# Patient Record
Sex: MALE | Race: WHITE | NOT HISPANIC OR LATINO | Employment: FULL TIME | URBAN - METROPOLITAN AREA
[De-identification: names, ages, dates, MRNs, and addresses within clinical notes are randomized per-mention and may not be internally consistent; named-entity substitution may affect disease eponyms.]

---

## 2018-07-07 ENCOUNTER — APPOINTMENT (EMERGENCY)
Dept: RADIOLOGY | Facility: HOSPITAL | Age: 53
End: 2018-07-07
Payer: COMMERCIAL

## 2018-07-07 ENCOUNTER — HOSPITAL ENCOUNTER (EMERGENCY)
Facility: HOSPITAL | Age: 53
End: 2018-07-07
Payer: COMMERCIAL

## 2018-07-07 ENCOUNTER — APPOINTMENT (INPATIENT)
Dept: RADIOLOGY | Facility: HOSPITAL | Age: 53
DRG: 917 | End: 2018-07-07
Payer: COMMERCIAL

## 2018-07-07 ENCOUNTER — APPOINTMENT (EMERGENCY)
Dept: CT IMAGING | Facility: HOSPITAL | Age: 53
End: 2018-07-07
Payer: COMMERCIAL

## 2018-07-07 ENCOUNTER — HOSPITAL ENCOUNTER (INPATIENT)
Facility: HOSPITAL | Age: 53
LOS: 2 days | Discharge: LEFT BEFORE DISCHARGE | DRG: 917 | End: 2018-07-09
Attending: EMERGENCY MEDICINE | Admitting: EMERGENCY MEDICINE
Payer: COMMERCIAL

## 2018-07-07 VITALS
HEART RATE: 97 BPM | BODY MASS INDEX: 19.51 KG/M2 | OXYGEN SATURATION: 97 % | TEMPERATURE: 97.6 F | DIASTOLIC BLOOD PRESSURE: 57 MMHG | SYSTOLIC BLOOD PRESSURE: 111 MMHG | HEIGHT: 74 IN | RESPIRATION RATE: 27 BRPM | WEIGHT: 152 LBS

## 2018-07-07 DIAGNOSIS — T50.904A DRUG OVERDOSE, UNDETERMINED INTENT, INITIAL ENCOUNTER: ICD-10-CM

## 2018-07-07 DIAGNOSIS — I21.3 ST ELEVATION MYOCARDIAL INFARCTION (STEMI), UNSPECIFIED ARTERY (HCC): Primary | ICD-10-CM

## 2018-07-07 DIAGNOSIS — T40.5X1A: Primary | ICD-10-CM

## 2018-07-07 LAB
ALBUMIN SERPL BCP-MCNC: 4.2 G/DL (ref 3.5–5.7)
ALP SERPL-CCNC: 50 U/L (ref 40–150)
ALT SERPL W P-5'-P-CCNC: 10 U/L (ref 7–52)
ANION GAP SERPL CALCULATED.3IONS-SCNC: 13 MMOL/L (ref 4–13)
APAP SERPL-MCNC: <10 UG/ML (ref 10–30)
ARTERIAL PATENCY WRIST A: YES
AST SERPL W P-5'-P-CCNC: 24 U/L (ref 13–39)
ATRIAL RATE: 83 BPM
BASE EXCESS BLDA CALC-SCNC: -3.1 MMOL/L (ref -2–3)
BILIRUB SERPL-MCNC: 0.6 MG/DL (ref 0.2–1)
BUN SERPL-MCNC: 23 MG/DL (ref 7–25)
CALCIUM SERPL-MCNC: 9.5 MG/DL (ref 8.6–10.5)
CHLORIDE SERPL-SCNC: 107 MMOL/L (ref 98–107)
CK MB SERPL-MCNC: 1 % (ref 0–2.5)
CK MB SERPL-MCNC: 26.6 NG/ML (ref 0–5)
CK SERPL-CCNC: 2712 U/L (ref 39–308)
CO2 SERPL-SCNC: 23 MMOL/L (ref 21–31)
CREAT SERPL-MCNC: 2.5 MG/DL (ref 0.7–1.3)
EOSINOPHIL # BLD AUTO: 0.23 THOUSAND/UL (ref 0–0.61)
EOSINOPHIL NFR BLD MANUAL: 1 % (ref 0–6)
ERYTHROCYTE [DISTWIDTH] IN BLOOD BY AUTOMATED COUNT: 14 % (ref 11.5–14.5)
ETHANOL SERPL-MCNC: <10 MG/DL
GFR SERPL CREATININE-BSD FRML MDRD: 28 ML/MIN/1.73SQ M
GLUCOSE SERPL-MCNC: 100 MG/DL (ref 65–140)
GLUCOSE SERPL-MCNC: 94 MG/DL (ref 65–99)
GLUCOSE SERPL-MCNC: 95 MG/DL (ref 65–140)
HCO3 BLDA-SCNC: 22.9 MMOL/L (ref 22–28)
HCT VFR BLD AUTO: 40.4 % (ref 36.5–49.3)
HGB BLD-MCNC: 13.7 G/DL (ref 14–18)
HOLD SPECIMEN: NORMAL
HOROWITZ INDEX BLDA+IHG-RTO: 100 MM[HG]
LACTATE SERPL-SCNC: 0.8 MMOL/L (ref 0.5–2)
LACTATE SERPL-SCNC: 2.6 MMOL/L (ref 0.5–2)
LYMPHOCYTES # BLD AUTO: 0.46 THOUSAND/UL (ref 0.6–4.47)
LYMPHOCYTES # BLD AUTO: 2 % (ref 20–51)
MAGNESIUM SERPL-MCNC: 2.8 MG/DL (ref 1.6–2.6)
MCH RBC QN AUTO: 32.4 PG (ref 26–34)
MCHC RBC AUTO-ENTMCNC: 33.9 G/DL (ref 31–37)
MCV RBC AUTO: 96 FL (ref 81–99)
MONOCYTES # BLD AUTO: 0.68 THOUSAND/UL (ref 0–1.22)
MONOCYTES NFR BLD AUTO: 3 % (ref 4–12)
NEUTS SEG # BLD: 21.43 THOUSAND/UL (ref 1.81–6.82)
NEUTS SEG NFR BLD AUTO: 94 % (ref 42–75)
NRBC BLD AUTO-RTO: 0 /100 WBCS
O2 CT BLDA-SCNC: 17.7 ML/DL
OXYHGB MFR BLDA: 97.2 % (ref 94–100)
P AXIS: 76 DEGREES
PCO2 BLDA: 47.8 MM HG (ref 35–45)
PEEP RESPIRATORY: 5 CM[H2O]
PH BLDA: 7.3 [PH] (ref 7.35–7.45)
PHOSPHATE SERPL-MCNC: 4.5 MG/DL (ref 2.7–4.5)
PLATELET # BLD AUTO: 264 THOUSANDS/UL (ref 149–390)
PLATELET BLD QL SMEAR: ADEQUATE
PMV BLD AUTO: 8.8 FL (ref 8.6–11.7)
PO2 BLDA: 486 MM HG (ref 80–100)
POTASSIUM SERPL-SCNC: 5.1 MMOL/L (ref 3.5–5.5)
PR INTERVAL: 146 MS
PROT SERPL-MCNC: 6.9 G/DL (ref 6.4–8.9)
QRS AXIS: 82 DEGREES
QRSD INTERVAL: 83 MS
QT INTERVAL: 392 MS
QTC INTERVAL: 461 MS
RBC # BLD AUTO: 4.23 MILLION/UL (ref 4.3–5.9)
RBC MORPH BLD: NORMAL
SALICYLATES SERPL-MCNC: <5 MG/DL (ref 10–30)
SODIUM SERPL-SCNC: 143 MMOL/L (ref 134–143)
SPECIMEN SOURCE: ABNORMAL
T WAVE AXIS: 69 DEGREES
TOTAL CELLS COUNTED SPEC: 100
TROPONIN I SERPL-MCNC: 0.03 NG/ML
TROPONIN I SERPL-MCNC: 0.04 NG/ML
TROPONIN I SERPL-MCNC: 0.04 NG/ML
VENT AC: 12
VENTRICULAR RATE: 83 BPM
VT SETTING VENT: 400 ML
WBC # BLD AUTO: 22.8 THOUSAND/UL (ref 4.8–10.8)

## 2018-07-07 PROCEDURE — 36415 COLL VENOUS BLD VENIPUNCTURE: CPT

## 2018-07-07 PROCEDURE — 82805 BLOOD GASES W/O2 SATURATION: CPT

## 2018-07-07 PROCEDURE — 96375 TX/PRO/DX INJ NEW DRUG ADDON: CPT

## 2018-07-07 PROCEDURE — 94760 N-INVAS EAR/PLS OXIMETRY 1: CPT

## 2018-07-07 PROCEDURE — 82553 CREATINE MB FRACTION: CPT | Performed by: STUDENT IN AN ORGANIZED HEALTH CARE EDUCATION/TRAINING PROGRAM

## 2018-07-07 PROCEDURE — 96376 TX/PRO/DX INJ SAME DRUG ADON: CPT

## 2018-07-07 PROCEDURE — 93005 ELECTROCARDIOGRAM TRACING: CPT

## 2018-07-07 PROCEDURE — 84484 ASSAY OF TROPONIN QUANT: CPT | Performed by: STUDENT IN AN ORGANIZED HEALTH CARE EDUCATION/TRAINING PROGRAM

## 2018-07-07 PROCEDURE — 93010 ELECTROCARDIOGRAM REPORT: CPT | Performed by: INTERNAL MEDICINE

## 2018-07-07 PROCEDURE — 36600 WITHDRAWAL OF ARTERIAL BLOOD: CPT

## 2018-07-07 PROCEDURE — 87040 BLOOD CULTURE FOR BACTERIA: CPT

## 2018-07-07 PROCEDURE — 99291 CRITICAL CARE FIRST HOUR: CPT | Performed by: EMERGENCY MEDICINE

## 2018-07-07 PROCEDURE — 80320 DRUG SCREEN QUANTALCOHOLS: CPT

## 2018-07-07 PROCEDURE — 85007 BL SMEAR W/DIFF WBC COUNT: CPT

## 2018-07-07 PROCEDURE — 80329 ANALGESICS NON-OPIOID 1 OR 2: CPT

## 2018-07-07 PROCEDURE — 71045 X-RAY EXAM CHEST 1 VIEW: CPT

## 2018-07-07 PROCEDURE — 82550 ASSAY OF CK (CPK): CPT | Performed by: STUDENT IN AN ORGANIZED HEALTH CARE EDUCATION/TRAINING PROGRAM

## 2018-07-07 PROCEDURE — 80053 COMPREHEN METABOLIC PANEL: CPT

## 2018-07-07 PROCEDURE — 5A1935Z RESPIRATORY VENTILATION, LESS THAN 24 CONSECUTIVE HOURS: ICD-10-PCS | Performed by: EMERGENCY MEDICINE

## 2018-07-07 PROCEDURE — 82948 REAGENT STRIP/BLOOD GLUCOSE: CPT

## 2018-07-07 PROCEDURE — 83605 ASSAY OF LACTIC ACID: CPT

## 2018-07-07 PROCEDURE — 84100 ASSAY OF PHOSPHORUS: CPT | Performed by: STUDENT IN AN ORGANIZED HEALTH CARE EDUCATION/TRAINING PROGRAM

## 2018-07-07 PROCEDURE — 94002 VENT MGMT INPAT INIT DAY: CPT

## 2018-07-07 PROCEDURE — 96374 THER/PROPH/DIAG INJ IV PUSH: CPT

## 2018-07-07 PROCEDURE — 83605 ASSAY OF LACTIC ACID: CPT | Performed by: STUDENT IN AN ORGANIZED HEALTH CARE EDUCATION/TRAINING PROGRAM

## 2018-07-07 PROCEDURE — 84484 ASSAY OF TROPONIN QUANT: CPT

## 2018-07-07 PROCEDURE — 96361 HYDRATE IV INFUSION ADD-ON: CPT

## 2018-07-07 PROCEDURE — 85027 COMPLETE CBC AUTOMATED: CPT

## 2018-07-07 PROCEDURE — 83735 ASSAY OF MAGNESIUM: CPT | Performed by: STUDENT IN AN ORGANIZED HEALTH CARE EDUCATION/TRAINING PROGRAM

## 2018-07-07 PROCEDURE — 99291 CRITICAL CARE FIRST HOUR: CPT

## 2018-07-07 PROCEDURE — 70450 CT HEAD/BRAIN W/O DYE: CPT

## 2018-07-07 RX ORDER — SODIUM CHLORIDE 9 MG/ML
1000 INJECTION, SOLUTION INTRAVENOUS CONTINUOUS
Status: DISCONTINUED | OUTPATIENT
Start: 2018-07-07 | End: 2018-07-07 | Stop reason: HOSPADM

## 2018-07-07 RX ORDER — PROPOFOL 10 MG/ML
5-50 INJECTION, EMULSION INTRAVENOUS
Status: DISCONTINUED | OUTPATIENT
Start: 2018-07-07 | End: 2018-07-07

## 2018-07-07 RX ORDER — ASPIRIN 325 MG
325 TABLET ORAL ONCE
Status: DISCONTINUED | OUTPATIENT
Start: 2018-07-07 | End: 2018-07-07 | Stop reason: HOSPADM

## 2018-07-07 RX ORDER — SODIUM CHLORIDE 9 MG/ML
125 INJECTION, SOLUTION INTRAVENOUS CONTINUOUS
Status: DISCONTINUED | OUTPATIENT
Start: 2018-07-07 | End: 2018-07-09 | Stop reason: HOSPADM

## 2018-07-07 RX ORDER — LORAZEPAM 2 MG/ML
2 INJECTION INTRAMUSCULAR ONCE
Status: COMPLETED | OUTPATIENT
Start: 2018-07-07 | End: 2018-07-07

## 2018-07-07 RX ORDER — FENTANYL CITRATE 50 UG/ML
50 INJECTION, SOLUTION INTRAMUSCULAR; INTRAVENOUS EVERY 2 HOUR PRN
Status: DISCONTINUED | OUTPATIENT
Start: 2018-07-07 | End: 2018-07-07

## 2018-07-07 RX ORDER — LABETALOL HYDROCHLORIDE 5 MG/ML
10 INJECTION, SOLUTION INTRAVENOUS ONCE
Status: COMPLETED | OUTPATIENT
Start: 2018-07-07 | End: 2018-07-07

## 2018-07-07 RX ORDER — CLOPIDOGREL BISULFATE 75 MG/1
600 TABLET ORAL DAILY
Status: DISCONTINUED | OUTPATIENT
Start: 2018-07-07 | End: 2018-07-07 | Stop reason: HOSPADM

## 2018-07-07 RX ORDER — HEPARIN SODIUM 1000 [USP'U]/ML
5000 INJECTION, SOLUTION INTRAVENOUS; SUBCUTANEOUS ONCE
Status: COMPLETED | OUTPATIENT
Start: 2018-07-07 | End: 2018-07-07

## 2018-07-07 RX ORDER — SUCCINYLCHOLINE CHLORIDE 20 MG/ML
50 INJECTION INTRAMUSCULAR; INTRAVENOUS ONCE
Status: COMPLETED | OUTPATIENT
Start: 2018-07-07 | End: 2018-07-07

## 2018-07-07 RX ORDER — HEPARIN SODIUM 5000 [USP'U]/ML
5000 INJECTION, SOLUTION INTRAVENOUS; SUBCUTANEOUS EVERY 8 HOURS SCHEDULED
Status: DISCONTINUED | OUTPATIENT
Start: 2018-07-07 | End: 2018-07-09 | Stop reason: HOSPADM

## 2018-07-07 RX ORDER — VECURONIUM BROMIDE 1 MG/ML
10 INJECTION, POWDER, LYOPHILIZED, FOR SOLUTION INTRAVENOUS ONCE
Status: COMPLETED | OUTPATIENT
Start: 2018-07-07 | End: 2018-07-07

## 2018-07-07 RX ORDER — ACETAMINOPHEN 650 MG/1
650 SUPPOSITORY RECTAL ONCE
Status: DISCONTINUED | OUTPATIENT
Start: 2018-07-07 | End: 2018-07-07 | Stop reason: HOSPADM

## 2018-07-07 RX ORDER — CHLORHEXIDINE GLUCONATE 0.12 MG/ML
15 RINSE ORAL EVERY 12 HOURS SCHEDULED
Status: DISCONTINUED | OUTPATIENT
Start: 2018-07-07 | End: 2018-07-08

## 2018-07-07 RX ORDER — MIDAZOLAM HYDROCHLORIDE 1 MG/ML
2 INJECTION INTRAMUSCULAR; INTRAVENOUS ONCE
Status: COMPLETED | OUTPATIENT
Start: 2018-07-07 | End: 2018-07-07

## 2018-07-07 RX ORDER — DIAZEPAM 5 MG/ML
5 INJECTION, SOLUTION INTRAMUSCULAR; INTRAVENOUS
Status: DISCONTINUED | OUTPATIENT
Start: 2018-07-07 | End: 2018-07-07

## 2018-07-07 RX ORDER — PROPOFOL 10 MG/ML
5-50 INJECTION, EMULSION INTRAVENOUS
Status: DISCONTINUED | OUTPATIENT
Start: 2018-07-07 | End: 2018-07-07 | Stop reason: HOSPADM

## 2018-07-07 RX ADMIN — SODIUM CHLORIDE 1000 ML: 0.9 INJECTION, SOLUTION INTRAVENOUS at 15:23

## 2018-07-07 RX ADMIN — LORAZEPAM 2 MG: 2 INJECTION INTRAMUSCULAR; INTRAVENOUS at 16:28

## 2018-07-07 RX ADMIN — SUCCINYLCHOLINE CHLORIDE SOL PREF SYR 200 MG/10ML (20 MG/ML) 50 MG: 200/10 SOLUTION PREFILLED SYRINGE at 15:50

## 2018-07-07 RX ADMIN — HEPARIN SODIUM 5000 UNITS: 5000 INJECTION, SOLUTION INTRAVENOUS; SUBCUTANEOUS at 22:50

## 2018-07-07 RX ADMIN — CHLORHEXIDINE GLUCONATE 15 ML: 1.2 RINSE ORAL at 21:15

## 2018-07-07 RX ADMIN — MIDAZOLAM HYDROCHLORIDE 2 MG: 1 INJECTION, SOLUTION INTRAMUSCULAR; INTRAVENOUS at 15:47

## 2018-07-07 RX ADMIN — LABETALOL HYDROCHLORIDE 10 MG: 5 INJECTION, SOLUTION INTRAVENOUS at 15:21

## 2018-07-07 RX ADMIN — VECURONIUM BROMIDE 10 MG: 10 INJECTION, POWDER, LYOPHILIZED, FOR SOLUTION INTRAVENOUS at 15:54

## 2018-07-07 RX ADMIN — SODIUM CHLORIDE 125 ML/HR: 0.9 INJECTION, SOLUTION INTRAVENOUS at 20:09

## 2018-07-07 RX ADMIN — PROPOFOL 4.32 MCG/KG/MIN: 10 INJECTION, EMULSION INTRAVENOUS at 16:26

## 2018-07-07 RX ADMIN — LORAZEPAM 2 MG: 2 INJECTION INTRAMUSCULAR; INTRAVENOUS at 15:21

## 2018-07-07 RX ADMIN — SODIUM CHLORIDE 1000 ML/HR: 0.9 INJECTION, SOLUTION INTRAVENOUS at 16:30

## 2018-07-07 RX ADMIN — HEPARIN SODIUM 5000 UNITS: 1000 INJECTION INTRAVENOUS; SUBCUTANEOUS at 16:29

## 2018-07-07 NOTE — RESPIRATORY THERAPY NOTE
RT Ventilator Management Note  Gely Pedro 47 y o  male MRN: [id number]  Unit/Bed#: ED 05 Encounter: [id number]      Daily Screen       7/7/2018 1600             Patient safety screen outcome[de-identified] Failed    Not Ready for Weaning due to[de-identified] Going on Transport intubated; Underline problem not resolved            Physical Exam:          Resp Comments: (P) pt just intubated by Dr Flory Rizzo, ETT size 8 [email or web address] and secure with anchor fast  Vent settings are:,12,100% +5 peep

## 2018-07-07 NOTE — RESPIRATORY THERAPY NOTE
RT Protocol Note  Rizwana Solo 47 y o  male MRN: [id number]  [facility]: [id number]    Assessment    Active Problems:    * No active hospital problems  *      Home Pulmonary Medications:  n/a       No past medical history on file  Social History     Social History    Marital status: Unknown     Spouse name: N/A    Number of children: N/A    Years of education: N/A     Social History Main Topics    Smoking status: Not on file    Smokeless tobacco: Not on file    Alcohol use Not on file    Drug use: Yes     Types: Cocaine    Sexual activity: Not on file     Other Topics Concern    Not on file     Social History Narrative    No narrative on file       Subjective         Objective    Physical Exam:   Assessment Type: (P) Assess only  General Appearance: (P) Sedated  Respiratory Pattern: (P) Assisted  Chest Assessment: (P) Chest expansion asymmetrical, Trachea midline  Bilateral Breath Sounds: (P) Clear  R Breath Sounds: (P) Clear, Diminished  L Breath Sounds: (P) Clear, Diminished    Vitals:  Blood pressure 99/65, pulse 82, temperature 98 °F (36 7 °C), temperature source Rectal, resp  rate 14, height 6' 2" (1 88 m), weight 69 2 kg (152 lb 8 9 oz), SpO2 98 %  Results from last 7 days  Lab Units 07/07/18  1636   PH ART  7 300*   PCO2 ART mm Hg 47 8*   PO2 ART mm Hg 486 0*   HCO3 ART mmol/L 22 9   BASE EXC ART mmol/L -3 1*   O2 CONTENT ART mL/dL 17 7   O2 HGB, ARTERIAL % 97 2   ABG SOURCE  Radial, Right   ASHLY TEST  Yes       Imaging and other studies: I have personally reviewed pertinent reports  Plan    Respiratory Plan: (P) Vent/NIV/HFNC        Resp Comments: (P) Pt evaluated per resp protocol  Pt is currently intubated and unable to give medical hx  No pulmonary hx was found in his current med rec  Lungs are CTA B/L  No bronchodilators are indicated @ this time, will continue with resp protocol as per the vented pt policy

## 2018-07-07 NOTE — EMTALA/ACUTE CARE TRANSFER
[address] [facility]  [facility] [address]  [address] [facility] [address] [phone]  Dept: [phone]      EMTALA TRANSFER CONSENT    NAME Yannick NEWELL 1965                              MRN [id number]    I have been informed of my rights regarding examination, treatment, and transfer   by Dr Xiomara Shoemaker MD    Benefits: Specialized equipment and/or services available at the receiving facility (Include comment)________________________ (Cardiac Catheterization)    Risks: Potential for delay in receiving treatment, Potential deterioration of medical condition, Loss of IV, Increased discomfort during transfer, Possible worsening of condition or death during transfer      Transfer Request   I acknowledge that my medical condition has been evaluated and explained to me by the emergency department physician or other qualified medical person and/or my attending physician who has recommended and offered to me further medical examination and treatment  I understand the Hospital's obligation with respect to the treatment and stabilization of my emergency medical condition  I nevertheless request to be transferred  I release the Hospital, the doctor, and any other persons caring for me from all responsibility or liability for any injury or ill effects that may result from my transfer and agree to accept all responsibility for the consequences of my choice to transfer, rather than receive stabilizing treatment at the Hospital  I understand that because the transfer is my request, my insurance may not provide reimbursement for the services  The Hospital will assist and direct me and my family in how to make arrangements for transfer, but the hospital is not liable for any fees charged by the transport service    In spite of this understanding, I refuse to consent to further medical examination and treatment which has been offered to me, and request transfer to [address] [place] [address] Rd Name, [address] : [address] [place], [place]  I authorize the performance of emergency medical procedures and treatments upon me in both transit and upon arrival at the receiving facility  Additionally, I authorize the release of any and all medical records to the receiving facility and request they be transported with me, if possible  I authorize the performance of emergency medical procedures and treatments upon me in both transit and upon arrival at the receiving facility  Additionally, I authorize the release of any and all medical records to the receiving facility and request they be transported with me, if possible  I understand that the safest mode of transportation during a medical emergency is an ambulance and that the Hospital advocates the use of this mode of transport  Risks of traveling to the receiving facility by car, including absence of medical control, life sustaining equipment, such as oxygen, and medical personnel has been explained to me and I fully understand them  (ORLIN CORRECT BOX BELOW)  [facility]  ]  I consent to the stated transfer and to be transported by ambulance/helicopter  [  ]  I consent to the stated transfer, but refuse transportation by ambulance and accept full responsibility for my transportation by car  I understand the risks of non-ambulance transfers and I exonerate the Hospital and its staff from any deterioration in my condition that results from this refusal     X___________________________________________    DATE  18  TIME________  Signature of patient or legally responsible individual signing on patient behalf           RELATIONSHIP TO PATIENT_________________________          Provider Certification    NAME Vargas Stewart                                        [facility] 1965                              MRN [id number]    A medical screening exam was performed on the above named patient    Based on the examination:    Condition Necessitating Transfer The primary encounter diagnosis was ST elevation myocardial infarction (STEMI), unspecified artery ([facility] [address] )  A diagnosis of Drug overdose, undetermined intent, initial encounter was also pertinent to this visit  Patient Condition: The patient has been stabilized such that within reasonable medical probability, no material deterioration of the patient condition or the condition of the unborn child(freddy) is likely to result from the transfer    Reason for Transfer: Level of Care needed not available at this facility    [facility]   · Space available and qualified personnel available for treatment as acknowledged by Juliana Mackey, [phone]  · Agreed to accept transfer and to provide appropriate medical treatment as acknowledged by       Dr Lizet Quintana  · Appropriate medical records of the examination and treatment of the patient are provided at the time of transfer   [address] [facility], [address] _______  · Transfer will be performed by qualified personnel from    and appropriate transfer equipment as required, including the use of necessary and appropriate life support measures      Provider Certification: I have examined the patient and explained the following risks and benefits of being transferred/refusing transfer to the patient/family:  General risk, such as traffic hazards, adverse weather conditions, rough terrain or turbulence, possible failure of equipment (including vehicle or aircraft), or consequences of actions of persons outside the control of the transport personnel      Based on these reasonable risks and benefits to the patient and/or the unborn child(freddy), and based upon the information available at the time of the patients examination, I certify that the medical benefits reasonably to be expected from the provision of appropriate medical treatments at another medical facility outweigh the increasing risks, if any, to the individuals medical condition, and in the case of labor to the unborn child, from effecting the transfer      X____________________________________________ DATE 07/07/18        TIME_______      ORIGINAL - SEND TO MEDICAL RECORDS   COPY - SEND WITH PATIENT DURING TRANSFER

## 2018-07-07 NOTE — H&P
History and Physical - Critical Care  Tara Stiles 47 y o  male MRN: [id number]  Unit/Bed#: MICU 13 Encounter: [id number]     Reason for Admission / Chief Complaint:  Cocaine overdose and STEMI     History of Present Illness:  Tara Stiles is a 47 y o  male who presents initially to [facility] after he was found by police on the side of the road responsive only to verbal stimuli  He was brought in by ambulance and upon arrival to the ER at [facility] he was less responsive with a GCS of 8 (E2V2M4) but acutely agitated  Per report from EMS/police, patient did 5/9F of cocaine  It initial EKG showed just sinus tachycardia  However, he was found have a troponin elevation of 0 04 at 1518 that and a repeat EKG then showed ST elevations in V1, V2, and V3  He was intubated at [facility] emergency room using succinylcholine, vecuronium, and Versed  At this point the patient was transferred to [facility] ICU for further management via helicopter  On arrival to the ICU here the patient was intubated on a propofol drip and unable to answer any questions  Family has yet to arrive to provide any further history  History obtained from chart review and unobtainable from patient due to intubation  Past Medical History:  No past medical history on file  Past Surgical History:  No past surgical history on file  Past Family History:  No family history on file  Social History:  History   Smoking Status    Not on file   Smokeless Tobacco    Not on file     History   Alcohol use Not on file     History   Drug Use    Types: Cocaine     Marital Status: Unknown     Medications:  No current facility-administered medications for this encounter  Home medications:  Prior to Admission medications    Not on File     Allergies:   Allergies   Allergen Reactions    Motrin [Ibuprofen]         ROS:   Review of Systems   Unable to perform ROS: Intubated        Vitals:  Vitals:    07/07/18 1853 07/07/18 1900   BP:  119/55   BP Location:  Left arm   Pulse:  88   Temp:  98 °F (36 7 °C)   TempSrc:  Rectal   SpO2: 99% 99%   Weight:  69 2 kg (152 lb 8 9 oz)   Height:  6' 2" (1 88 m)     Temperature:   Temp (24hrs), Av 8 °F (36 6 °C), Min:97 6 °F (36 4 °C), Max:98 °F (36 7 °C)    Current: Temperature: 98 °F (36 7 °C)     Weights:   IBW: 82 2 kg  Body mass index is 19 59 kg/m²  Hemodynamic Monitoring:  N/A     Non-Invasive/Invasive Ventilation Settings:  Respiratory    Lab Data (Last 4 hours)       1636            pH, Arterial       (!)7 300           pCO2, Arterial       (!)47 8           pO2, Arterial       (!)486 0           HCO3, Arterial       22 9           Base Excess, Arterial       (!)-3 1                O2/Vent Data        1600   Most Recent         Vent Mode AC/VC  AC/VC      Resp Rate (BPM) (BPM) 12  14      Vt (mL) (mL) 400  550      FIO2 (%) (%) 100  50      PEEP (cmH2O) (cmH2O) 5  5      Patient safety screen outcome: Failed  Failed      MV 6 66  7 56                Lab Results   Component Value Date    PHART 7 300 (L) 2018    WNT3VDY 47 8 (H) 2018    PO2ART 486 0 (H) 2018    ZEU2WFW 22 9 2018    BEART -3 1 (L) 2018    SOURCE Radial, Right 2018     SpO2: SpO2: 98 %     Physical Exam:  Physical Exam   Constitutional: He appears well-developed and well-nourished  No distress  HENT:   Head: Normocephalic and atraumatic  Eyes: Conjunctivae are normal  Pupils are equal, round, and reactive to light  Right eye exhibits no discharge  Left eye exhibits no discharge  No scleral icterus  Neck: No JVD present  Cardiovascular: Normal rate, regular rhythm, normal heart sounds and intact distal pulses  Exam reveals no gallop and no friction rub  No murmur heard  Pulmonary/Chest: Effort normal and breath sounds normal  No respiratory distress  He has no wheezes  He has no rales  Abdominal: Soft  Bowel sounds are normal  He exhibits no distension and no mass   There is no rebound  Genitourinary: Penis normal    Musculoskeletal: He exhibits no edema or deformity  Neurological: GCS eye subscore is 1  GCS verbal subscore is 1  GCS motor subscore is 4  He displays no Babinski's sign on the right side  He displays no Babinski's sign on the left side  Neuro exam limited secondary to sedation  Skin: Skin is warm and dry  No rash noted  He is not diaphoretic  No erythema  No pallor  Multiple superficial abrasions covering patient's bilateral lower extremities and abdomen  Nursing note and vitals reviewed  Labs:    Results from last 7 days  Lab Units 07/07/18  1518   WBC Thousand/uL 22 80*   HEMOGLOBIN g/dL 13 7*   HEMATOCRIT % 40 4   PLATELETS Thousands/uL 264   MONO PCT MAN % 3*      Results from last 7 days  Lab Units 07/07/18  1649   SODIUM mmol/L 143   POTASSIUM mmol/L 5 1   CHLORIDE mmol/L 107   CO2 mmol/L 23   BUN mg/dL 23   CREATININE mg/dL 2 50*   CALCIUM mg/dL 9 5   TOTAL PROTEIN g/dL 6 9   BILIRUBIN TOTAL mg/dL 0 60   ALK PHOS U/L 50   ALT U/L 10   AST U/L 24   GLUCOSE RANDOM mg/dL 94                    Results from last 7 days  Lab Units 07/07/18  1649   LACTIC ACID mmol/L 2 6*       0  Lab Value Date/Time   TROPONINI 0 04 (H) 07/07/2018 1518        Imaging:  Chest x-ray:  Interval intubation with the tip of the tracheal tube lying approximately 6 cm above the ingris  Pulmonary emphysema and chronic peribronchial  changes in both lungs  Nasogastric tube extending below the  hemidiaphragms  CT head:No current evidence of acute intracranial process         I have personally reviewed pertinent films in PACS  EKG:  Unable to few patient is initial EKGs  Will repeat  Repeat EKG shows no sign of ST elevations  Normal sinus rhythm at 83 beats per minute and a QTc of 461  Micro:  No results found for: Duarte Juarez SPUTUMCULTUR    Assessment:  Cocaine overdose status post intubation doing well on the vent at this time          Plan: Neuro: No active issues at this time but will continue to monitor for signs of agitation in the setting of cocaine overdose  Will order IV Valium 5 mg q10 minutes prn  Furthermore, the patient's ethanol level was negative at [facility] but will continue to monitor for signs of alcohol withdrawal, which can also be treated with the Valium ordered  CV:  Repeat EKG here at the ICU shows no signs of ST elevation  Will continue to trend troponins and monitor for any EKG changes  Lung:  Patient currently ventilated on AC 14/550/40/5  Will attempt to awaken patient off of the propofol drip and if calm, we will attempt to wean and possibly extubate the patient  GI:  Patient with OG tube in place  Continue OG tube to low continuous wall suction  FEN:  Will run normal saline at 125 mL per hour to help clear lactate  Will monitor patient's electrolytes with goal potassium greater than four, phosphorus greater than three, and magnesium greater than two  Will make patient NPO for now  :  Steward in place  Continue routine care  ID:  No active issues at this time  Continue to monitor WBC and fever curves for signs of infection  Heme:  Lactate initially 2 6  Patient currently on IV fluid infusion and will continue to trend lactate until it normalizes  Endo:  No active issues  Msk/Skin:  Continue routine ICU care  Patient with multiple superficial abrasions to bilateral lower extremities, abdomen, and back  Continue to monitor for signs of infection  Disposition:  ICU     VTE Pharmacologic Prophylaxis: Heparin  VTE Mechanical Prophylaxis: sequential compression device     Invasive lines and devices:   Invasive Devices     Peripheral Intravenous Line            Peripheral IV 07/07/18 Left Antecubital less than 1 day    Peripheral IV 07/07/18 Right Forearm less than 1 day Drain            NG/OG/Enteral Tube Orogastric 16 Fr Right mouth less than 1 day    Urethral Catheter Straight-tip 16 Fr  less than 1 day          Airway            ETT  Cuffed; Hi-Lo 8 mm less than 1 day                 Code Status: No Order  POA:    POLST:       Given critical illness, patient length of stay will require greater than two midnights  Counseling / Coordination of Care  Total Critical Care time spent 30 minutes excluding procedures, teaching and family updates  Portions of the record may have been created with voice recognition software  Occasional wrong word or "sound a like" substitutions may have occurred due to the inherent limitations of voice recognition software  Read the chart carefully and recognize, using context, where substitutions have occurred          Lesli Darby MD
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
## 2018-07-07 NOTE — RESPIRATORY THERAPY NOTE
RT Ventilator Management Note  Tayla Shoemaker 47 y o  male MRN: [id number]  Unit/Bed#: [facility] 13 Encounter: [id number]      Daily Screen       7/7/2018 1600             Patient safety screen outcome[de-identified] Failed    Not Ready for Weaning due to[de-identified] Going on Transport intubated; Underline problem not resolved            Physical Exam:   Assessment Type: (P) Assess only  General Appearance: (P) Sedated  Respiratory Pattern: (P) Assisted  Chest Assessment: (P) Chest expansion asymmetrical, Trachea midline  Bilateral Breath Sounds: (P) Clear  R Breath Sounds: (P) Clear, Diminished  L Breath Sounds: (P) Clear, Diminished      Resp Comments: (P) Pt received already intubated @ another facility  Pt placed on vent on AC  VS are stable @ this time  Will continue to monitor through out the shift

## 2018-07-08 VITALS
OXYGEN SATURATION: 98 % | TEMPERATURE: 98.2 F | HEART RATE: 95 BPM | SYSTOLIC BLOOD PRESSURE: 149 MMHG | WEIGHT: 152.56 LBS | RESPIRATION RATE: 18 BRPM | BODY MASS INDEX: 19.58 KG/M2 | DIASTOLIC BLOOD PRESSURE: 75 MMHG | HEIGHT: 74 IN

## 2018-07-08 PROBLEM — J96.01 ACUTE RESPIRATORY FAILURE WITH HYPOXIA (HCC): Status: ACTIVE | Noted: 2018-07-08

## 2018-07-08 PROBLEM — T40.5X1A COCAINE OVERDOSE (HCC): Status: ACTIVE | Noted: 2018-07-08

## 2018-07-08 PROBLEM — G93.40 ENCEPHALOPATHY ACUTE: Status: ACTIVE | Noted: 2018-07-08

## 2018-07-08 PROBLEM — D72.829 LEUKOCYTOSIS: Status: ACTIVE | Noted: 2018-07-08

## 2018-07-08 PROBLEM — N17.9 AKI (ACUTE KIDNEY INJURY) (HCC): Status: ACTIVE | Noted: 2018-07-08

## 2018-07-08 PROBLEM — M62.82 RHABDOMYOLYSIS: Status: ACTIVE | Noted: 2018-07-08

## 2018-07-08 LAB
ANION GAP SERPL CALCULATED.3IONS-SCNC: 6 MMOL/L (ref 4–13)
BUN SERPL-MCNC: 27 MG/DL (ref 5–25)
CALCIUM SERPL-MCNC: 7.9 MG/DL (ref 8.3–10.1)
CHLORIDE SERPL-SCNC: 110 MMOL/L (ref 100–108)
CO2 SERPL-SCNC: 26 MMOL/L (ref 21–32)
CREAT SERPL-MCNC: 1.82 MG/DL (ref 0.6–1.3)
GFR SERPL CREATININE-BSD FRML MDRD: 42 ML/MIN/1.73SQ M
GLUCOSE SERPL-MCNC: 120 MG/DL (ref 65–140)
POTASSIUM SERPL-SCNC: 4.2 MMOL/L (ref 3.5–5.3)
SODIUM SERPL-SCNC: 142 MMOL/L (ref 136–145)

## 2018-07-08 PROCEDURE — 99232 SBSQ HOSP IP/OBS MODERATE 35: CPT | Performed by: INTERNAL MEDICINE

## 2018-07-08 PROCEDURE — 80048 BASIC METABOLIC PNL TOTAL CA: CPT | Performed by: EMERGENCY MEDICINE

## 2018-07-08 RX ORDER — THIAMINE MONONITRATE (VIT B1) 100 MG
100 TABLET ORAL DAILY
Status: DISCONTINUED | OUTPATIENT
Start: 2018-07-08 | End: 2018-07-09 | Stop reason: HOSPADM

## 2018-07-08 RX ORDER — ONDANSETRON 2 MG/ML
4 INJECTION INTRAMUSCULAR; INTRAVENOUS EVERY 4 HOURS PRN
Status: DISCONTINUED | OUTPATIENT
Start: 2018-07-08 | End: 2018-07-09 | Stop reason: HOSPADM

## 2018-07-08 RX ORDER — FOLIC ACID 1 MG/1
1 TABLET ORAL DAILY
Status: DISCONTINUED | OUTPATIENT
Start: 2018-07-08 | End: 2018-07-09 | Stop reason: HOSPADM

## 2018-07-08 RX ORDER — LORAZEPAM 2 MG/ML
1 INJECTION INTRAMUSCULAR EVERY 4 HOURS PRN
Status: DISCONTINUED | OUTPATIENT
Start: 2018-07-08 | End: 2018-07-09 | Stop reason: HOSPADM

## 2018-07-08 RX ORDER — ACETAMINOPHEN 325 MG/1
650 TABLET ORAL EVERY 6 HOURS PRN
Status: DISCONTINUED | OUTPATIENT
Start: 2018-07-08 | End: 2018-07-09 | Stop reason: HOSPADM

## 2018-07-08 RX ADMIN — SODIUM CHLORIDE 125 ML/HR: 0.9 INJECTION, SOLUTION INTRAVENOUS at 13:46

## 2018-07-08 RX ADMIN — HEPARIN SODIUM 5000 UNITS: 5000 INJECTION, SOLUTION INTRAVENOUS; SUBCUTANEOUS at 13:46

## 2018-07-08 RX ADMIN — SODIUM CHLORIDE 250 ML/HR: 0.9 INJECTION, SOLUTION INTRAVENOUS at 05:53

## 2018-07-08 RX ADMIN — FOLIC ACID 1 MG: 1 TABLET ORAL at 09:20

## 2018-07-08 RX ADMIN — Medication 100 MG: at 09:20

## 2018-07-08 RX ADMIN — SODIUM CHLORIDE 125 ML/HR: 0.9 INJECTION, SOLUTION INTRAVENOUS at 22:03

## 2018-07-08 RX ADMIN — HEPARIN SODIUM 5000 UNITS: 5000 INJECTION, SOLUTION INTRAVENOUS; SUBCUTANEOUS at 21:59

## 2018-07-08 NOTE — NURSING NOTE
Respiratory at bedside to extubate patient  2L NC applied to patient  O2 saturation 100%  No complication with extubation  Pt able to verbalize name and answer questions appropriately  Pt's mother [place] called and updated at [phone] and Nat [place] at [phone]  She plans to come in around 10 am tomorrow

## 2018-07-08 NOTE — PROGRESS NOTES
[facility] Internal Medicine Progress Note  Patient: Ever Pond 47 y o  male   MRN: [id number]  PCP: Tory Figueroa  [facility]: [id number]  Date Of Visit: 07/08/18    Assessment:    Principal Problem:    Cocaine overdose  Active Problems:    Encephalopathy acute    Rhabdomyolysis    Leukocytosis    Acute respiratory failure with hypoxia (HCC)    SUSHANT (acute kidney injury) ([facility] [address] )      Plan:    1  Acute respiratory failure with metabolic encephalopathy secondary to cocaine /  methamphetamine abuse/alcohol abuse, patient was extubated, currently awake alert oriented in no acute distress, PT OT eval  2  Cocaine/ methamphetamine abuse, refusing psych eval, continue with supportive care  3  Rhabdomyolysis, continue IV fluid for hydration,  monitor CPK and electrolytes  4  SUSHANT improving, continue with IV fluid for hydration, monitor  5  Leukocytosis, likely secondary to above, continue to monitor, follow on blood culture  6  Tobacco smoking, patient refused nicotine patch       VTE Pharmacologic Prophylaxis:   Pharmacologic: Heparin  Mechanical VTE Prophylaxis in Place: Yes    Patient Centered Rounds: I have performed bedside rounds with nursing staff today  Discussions with Specialists or Other Care Team Provider:     Education and Discussions with Family / Patient:  Patient    Time Spent for Care: 30 minutes  More than 50% of total time spent on counseling and coordination of care as described above      Current Length of Stay: 1 day(s)    Current Patient Status: Inpatient   Certification Statement: The patient will continue to require additional inpatient hospital stay due to Management of drug overdose and rhabdomyolysis    Discharge Plan / Estimated Discharge Date: not ready yet    Code Status: Level 1 - Full Code      Subjective:   Patient seen and examined  Comfortable sitting in bed eating breakfast  Denied chest pain or shortness of breath  He reports noting methamphetamine and cocaine in addition to alcohol abuse    Objective:     Vitals:   Temp (24hrs), Av °F (36 7 °C), Min:97 6 °F (36 4 °C), Max:98 1 °F (36 7 °C)    HR:  [] 101  Resp:  [11-33] 18  BP: ()/(55-82) 152/81  SpO2:  [91 %-100 %] 100 %  Body mass index is 19 59 kg/m²  Input and Output Summary (last 24 hours): Intake/Output Summary (Last 24 hours) at 18 0818  Last data filed at 18 0409   Gross per 24 hour   Intake          1602 42 ml   Output              895 ml   Net           707 42 ml       Physical Exam:     Physical Exam  Patient is awake alert oriented in no acute distress  Lung clear to auscultation bilateral  Heart positive S1-S2 no murmur  Abdomen soft nontender positive bowel sounds  Lower extremities no edema      Additional Data:     Labs:      Results from last 7 days  Lab Units 18  1518   WBC Thousand/uL 22 80*   HEMOGLOBIN g/dL 13 7*   HEMATOCRIT % 40 4   PLATELETS Thousands/uL 264   LYMPHO PCT % 2*   MONO PCT MAN % 3*   EOSINO PCT MANUAL % 1       Results from last 7 days  Lab Units 18  0007 18  1649   SODIUM mmol/L 142 143   POTASSIUM mmol/L 4 2 5 1   CHLORIDE mmol/L 110* 107   CO2 mmol/L 26 23   BUN mg/dL 27* 23   CREATININE mg/dL 1 82* 2 50*   CALCIUM mg/dL 7 9* 9 5   TOTAL PROTEIN g/dL  --  6 9   BILIRUBIN TOTAL mg/dL  --  0 60   ALK PHOS U/L  --  50   ALT U/L  --  10   AST U/L  --  24   GLUCOSE RANDOM mg/dL 120 94           * I Have Reviewed All Lab Data Listed Above  * Additional Pertinent Lab Tests Reviewed:  [facility] Admission Reviewed    Imaging:    Imaging Reports Reviewed Today Include:   Imaging Personally Reviewed by Myself Includes:      Recent Cultures (last 7 days):           Last 24 Hours Medication List:     Current Facility-Administered Medications:  chlorhexidine 15 mL [employer] Q12H [address] Leena Alanis MD    heparin (porcine) 5,000 Units Subcutaneous [facility] Leena Alanis MD    sodium chloride 250 mL/hr Intravenous Continuous Julia Manner DO Esteban Last Rate: 250 mL/hr (07/08/18 0553)        Today, Patient Was Seen By: King Moody DO    ** Please Note: This note has been constructed using a voice recognition system   **

## 2018-07-08 NOTE — NURSING NOTE
Pt transferred to P7 without complication  Fiance or mother not called at this time to make them aware due to time  I did pass this on to P7 RN

## 2018-07-08 NOTE — PROGRESS NOTES
Progress Note - ICU Transfer to Step down/med  surg  - [facility] Clock 47 y o  male MRN: [id number]    Unit/Bed#: [facility] 13 Encounter: [id number]      Code Status: Level 1 - Full Code    Date of ICU admission: 07Jul2018    Reason for ICU adm: Cocaine overdose with STEMI    Active problems: There is no problem list on file for this patient  Summary of clinical course:  Patient was admitted to the [facility] ICU on a propofol drip and intubated  He was immediately weaned off the propofol drip and extubated approximately 45 minutes after arrival   Patient's lactate from the [facility] ER had normalized and he did not require any PRNs  He was transferred out of the unit in good condition approximately 6 hours after arrival to [place]      Recent or scheduled procedures:  None    Outstanding/pending diagnostics:  None    Hospital discharge planning:  Per the [place] team

## 2018-07-08 NOTE — PLAN OF CARE
Problem: DISCHARGE PLANNING - CARE MANAGEMENT  Goal: Discharge to post-acute care or home with appropriate resources  INTERVENTIONS:  - Conduct assessment to determine patient/family and health care team treatment goals, and need for post-acute services based on payer coverage, community resources, and patient preferences, and barriers to discharge  - Address psychosocial, clinical, and financial barriers to discharge as identified in assessment in conjunction with the patient/family and health care team  - Arrange appropriate level of post-acute services according to patient's   needs and preference and payer coverage in collaboration with the physician and health care team  - Communicate with and update the patient/family, physician, and health care team regarding progress on the discharge plan  - Arrange appropriate transportation to post-acute venues  - pt will be discharged to home self care  Outcome: Progressing

## 2018-07-08 NOTE — SOCIAL WORK
Met with pt and explained Cm role  Pt lives with his fiance Darlene Brewster and dgt Ynes in a 1 story house with 5 QUANG  Pt was independent PTA and able to drive and is employed  Pt's PCP is Dr Chelsea Barboza  Pt is unhappy with his PCP  [employer] card provided  No DME avail  No hx of HHC or rehab  No hx of mental health issues  Pt stated that he uses alcohol occasionally and uses drugs occasionally  He stated that he has used "pot" and "meth" before with his last  Use of meth prior to admission  He stated prior to the use of drugs PTA was about 2-3 months prior  CM offered rehab options for alcohol and drug use, but pt declined  Pt has a prescription plan and uses [facility] in [place] for his prescriptions  Primary contact is pt's sabrina Jc Barriga, contact # [phone]  Pt's sabrina will provide pt with transportation home upon discharge  Pt does not have a POA  CM reviewed d/c planning process including the following: identifying help at home, patient preference for d/c planning needs, Discharge Lounge, [facility] Meds to Bed program, availability of treatment team to discuss questions or concerns patient and/or family may have regarding understanding medications and recognizing signs and symptoms once discharged  CM also encouraged patient to follow up with all recommended appointments after discharge  Patient advised of importance for patient and family to participate in managing patients medical well being  Patient/caregiver received discharge checklist  Content reviewed  Patient/caregiver encouraged to participate in discharge plan of care prior to discharge home

## 2018-07-09 LAB
ANION GAP SERPL CALCULATED.3IONS-SCNC: 8 MMOL/L (ref 4–13)
ATRIAL RATE: 111 BPM
ATRIAL RATE: 146 BPM
BASOPHILS # BLD AUTO: 0.04 THOUSANDS/ΜL (ref 0–0.1)
BASOPHILS NFR BLD AUTO: 0 % (ref 0–1)
BUN SERPL-MCNC: 16 MG/DL (ref 5–25)
CALCIUM SERPL-MCNC: 9.1 MG/DL (ref 8.3–10.1)
CHLORIDE SERPL-SCNC: 105 MMOL/L (ref 100–108)
CK MB SERPL-MCNC: 92.5 NG/ML (ref 0–5)
CK MB SERPL-MCNC: <1 % (ref 0–2.5)
CK SERPL-CCNC: ABNORMAL U/L (ref 39–308)
CO2 SERPL-SCNC: 27 MMOL/L (ref 21–32)
CREAT SERPL-MCNC: 0.98 MG/DL (ref 0.6–1.3)
EOSINOPHIL # BLD AUTO: 0.05 THOUSAND/ΜL (ref 0–0.61)
EOSINOPHIL NFR BLD AUTO: 0 % (ref 0–6)
ERYTHROCYTE [DISTWIDTH] IN BLOOD BY AUTOMATED COUNT: 13.5 % (ref 11.6–15.1)
GFR SERPL CREATININE-BSD FRML MDRD: 88 ML/MIN/1.73SQ M
GLUCOSE SERPL-MCNC: 121 MG/DL (ref 65–140)
HCT VFR BLD AUTO: 34.5 % (ref 36.5–49.3)
HGB BLD-MCNC: 11.5 G/DL (ref 12–17)
IMM GRANULOCYTES # BLD AUTO: 0.07 THOUSAND/UL (ref 0–0.2)
IMM GRANULOCYTES NFR BLD AUTO: 0 % (ref 0–2)
LYMPHOCYTES # BLD AUTO: 1.83 THOUSANDS/ΜL (ref 0.6–4.47)
LYMPHOCYTES NFR BLD AUTO: 11 % (ref 14–44)
MAGNESIUM SERPL-MCNC: 1.8 MG/DL (ref 1.6–2.6)
MCH RBC QN AUTO: 32 PG (ref 26.8–34.3)
MCHC RBC AUTO-ENTMCNC: 33.3 G/DL (ref 31.4–37.4)
MCV RBC AUTO: 96 FL (ref 82–98)
MONOCYTES # BLD AUTO: 1.87 THOUSAND/ΜL (ref 0.17–1.22)
MONOCYTES NFR BLD AUTO: 11 % (ref 4–12)
NEUTROPHILS # BLD AUTO: 13.03 THOUSANDS/ΜL (ref 1.85–7.62)
NEUTS SEG NFR BLD AUTO: 78 % (ref 43–75)
NRBC BLD AUTO-RTO: 0 /100 WBCS
P AXIS: 80 DEGREES
P AXIS: 86 DEGREES
PHOSPHATE SERPL-MCNC: 1.6 MG/DL (ref 2.7–4.5)
PLATELET # BLD AUTO: 184 THOUSANDS/UL (ref 149–390)
PMV BLD AUTO: 11.4 FL (ref 8.9–12.7)
POTASSIUM SERPL-SCNC: 3.6 MMOL/L (ref 3.5–5.3)
PR INTERVAL: 120 MS
PR INTERVAL: 130 MS
QRS AXIS: 84 DEGREES
QRS AXIS: 89 DEGREES
QRSD INTERVAL: 80 MS
QRSD INTERVAL: 80 MS
QT INTERVAL: 318 MS
QT INTERVAL: 342 MS
QTC INTERVAL: 432 MS
QTC INTERVAL: 532 MS
RBC # BLD AUTO: 3.59 MILLION/UL (ref 3.88–5.62)
SODIUM SERPL-SCNC: 140 MMOL/L (ref 136–145)
T WAVE AXIS: 33 DEGREES
T WAVE AXIS: 73 DEGREES
VENTRICULAR RATE: 111 BPM
VENTRICULAR RATE: 146 BPM
WBC # BLD AUTO: 16.89 THOUSAND/UL (ref 4.31–10.16)

## 2018-07-09 PROCEDURE — 83735 ASSAY OF MAGNESIUM: CPT | Performed by: INTERNAL MEDICINE

## 2018-07-09 PROCEDURE — 85025 COMPLETE CBC W/AUTO DIFF WBC: CPT | Performed by: INTERNAL MEDICINE

## 2018-07-09 PROCEDURE — 84100 ASSAY OF PHOSPHORUS: CPT | Performed by: INTERNAL MEDICINE

## 2018-07-09 PROCEDURE — 82553 CREATINE MB FRACTION: CPT | Performed by: INTERNAL MEDICINE

## 2018-07-09 PROCEDURE — 99239 HOSP IP/OBS DSCHRG MGMT >30: CPT | Performed by: INTERNAL MEDICINE

## 2018-07-09 PROCEDURE — 80048 BASIC METABOLIC PNL TOTAL CA: CPT | Performed by: INTERNAL MEDICINE

## 2018-07-09 PROCEDURE — 82550 ASSAY OF CK (CPK): CPT | Performed by: INTERNAL MEDICINE
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
## 2018-07-11 ENCOUNTER — TRANSCRIBE ORDERS (OUTPATIENT)
Dept: ADMINISTRATIVE | Facility: HOSPITAL | Age: 53
End: 2018-07-11

## 2018-07-11 DIAGNOSIS — T50.904A DRUG OVERDOSE, UNDETERMINED INTENT, INITIAL ENCOUNTER: Primary | ICD-10-CM

## 2018-07-11 DIAGNOSIS — I46.9 CARDIAC ARREST (HCC): ICD-10-CM

## 2018-07-13 LAB
BACTERIA BLD CULT: NORMAL
BACTERIA BLD CULT: NORMAL

## 2018-11-12 ENCOUNTER — HOSPITAL ENCOUNTER (EMERGENCY)
Facility: HOSPITAL | Age: 53
Discharge: HOME | End: 2018-11-12
Attending: EMERGENCY MEDICINE | Admitting: EMERGENCY MEDICINE
Payer: COMMERCIAL

## 2018-11-12 VITALS
BODY MASS INDEX: 21.18 KG/M2 | OXYGEN SATURATION: 98 % | TEMPERATURE: 97.9 F | WEIGHT: 165 LBS | SYSTOLIC BLOOD PRESSURE: 135 MMHG | HEART RATE: 79 BPM | DIASTOLIC BLOOD PRESSURE: 79 MMHG | RESPIRATION RATE: 16 BRPM

## 2018-11-12 DIAGNOSIS — S80.12XA CONTUSION OF LEFT LOWER EXTREMITY, INITIAL ENCOUNTER: Primary | ICD-10-CM

## 2018-11-12 PROCEDURE — 99284 EMERGENCY DEPT VISIT MOD MDM: CPT
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
## 2018-11-12 NOTE — DISCHARGE INSTRUCTIONS

## 2019-10-02 ENCOUNTER — HOSPITAL ENCOUNTER (EMERGENCY)
Facility: HOSPITAL | Age: 54
Discharge: HOME | End: 2019-10-02
Attending: EMERGENCY MEDICINE | Admitting: EMERGENCY MEDICINE
Payer: OTHER MISCELLANEOUS

## 2019-10-02 VITALS
HEART RATE: 78 BPM | OXYGEN SATURATION: 95 % | SYSTOLIC BLOOD PRESSURE: 148 MMHG | TEMPERATURE: 98.7 F | BODY MASS INDEX: 21.17 KG/M2 | WEIGHT: 165 LBS | DIASTOLIC BLOOD PRESSURE: 88 MMHG | HEIGHT: 74 IN | RESPIRATION RATE: 20 BRPM

## 2019-10-02 DIAGNOSIS — M54.50 ACUTE LOW BACK PAIN: Primary | ICD-10-CM

## 2019-10-02 PROCEDURE — 99283 EMERGENCY DEPT VISIT LOW MDM: CPT

## 2019-10-02 RX ORDER — TRAMADOL HYDROCHLORIDE 50 MG/1
50 TABLET ORAL ONCE
Status: COMPLETED | OUTPATIENT
Start: 2019-10-02 | End: 2019-10-02

## 2019-10-02 RX ORDER — CYCLOBENZAPRINE HCL 10 MG
10 TABLET ORAL EVERY 8 HOURS PRN
Qty: 15 TABLET | Refills: 0 | Status: SHIPPED | OUTPATIENT
Start: 2019-10-02 | End: 2019-10-14 | Stop reason: ALTCHOICE

## 2019-10-02 RX ORDER — CYCLOBENZAPRINE HCL 10 MG
10 TABLET ORAL ONCE
Status: COMPLETED | OUTPATIENT
Start: 2019-10-02 | End: 2019-10-02

## 2019-10-02 RX ORDER — TRAMADOL HYDROCHLORIDE 50 MG/1
50 TABLET ORAL EVERY 8 HOURS PRN
Qty: 10 TABLET | Refills: 0 | Status: SHIPPED | OUTPATIENT
Start: 2019-10-02 | End: 2019-10-14 | Stop reason: ALTCHOICE

## 2019-10-02 RX ADMIN — TRAMADOL HYDROCHLORIDE 50 MG: 50 TABLET, FILM COATED ORAL at 07:30

## 2019-10-02 RX ADMIN — CYCLOBENZAPRINE HYDROCHLORIDE 10 MG: 10 TABLET, FILM COATED ORAL at 07:30
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
## 2019-10-04 ENCOUNTER — APPOINTMENT (OUTPATIENT)
Dept: RADIOLOGY | Facility: CLINIC | Age: 54
End: 2019-10-04
Payer: OTHER MISCELLANEOUS

## 2019-10-04 ENCOUNTER — TRANSCRIBE ORDERS (OUTPATIENT)
Dept: URGENT CARE | Facility: CLINIC | Age: 54
End: 2019-10-04

## 2019-10-04 ENCOUNTER — APPOINTMENT (OUTPATIENT)
Dept: URGENT CARE | Facility: CLINIC | Age: 54
End: 2019-10-04
Payer: OTHER MISCELLANEOUS

## 2019-10-04 DIAGNOSIS — M54.50 ACUTE LOW BACK PAIN, UNSPECIFIED BACK PAIN LATERALITY, UNSPECIFIED WHETHER SCIATICA PRESENT: ICD-10-CM

## 2019-10-04 DIAGNOSIS — M54.50 ACUTE LOW BACK PAIN, UNSPECIFIED BACK PAIN LATERALITY, UNSPECIFIED WHETHER SCIATICA PRESENT: Primary | ICD-10-CM

## 2019-10-04 PROCEDURE — 72100 X-RAY EXAM L-S SPINE 2/3 VWS: CPT

## 2019-10-04 PROCEDURE — 99214 OFFICE O/P EST MOD 30 MIN: CPT | Performed by: NURSE PRACTITIONER

## 2019-10-07 ENCOUNTER — APPOINTMENT (OUTPATIENT)
Dept: URGENT CARE | Facility: CLINIC | Age: 54
End: 2019-10-07
Payer: OTHER MISCELLANEOUS

## 2019-10-07 PROCEDURE — 99213 OFFICE O/P EST LOW 20 MIN: CPT | Performed by: NURSE PRACTITIONER

## 2019-10-14 ENCOUNTER — OCCMED (OUTPATIENT)
Dept: URGENT CARE | Facility: CLINIC | Age: 54
End: 2019-10-14
Payer: OTHER MISCELLANEOUS

## 2019-10-14 DIAGNOSIS — S39.012D LUMBAR SPINE STRAIN, SUBSEQUENT ENCOUNTER: Primary | ICD-10-CM

## 2019-10-14 PROCEDURE — 99214 OFFICE O/P EST MOD 30 MIN: CPT | Performed by: NURSE PRACTITIONER

## 2019-10-14 RX ORDER — PREDNISONE 10 MG/1
TABLET ORAL
Qty: 21 TABLET | Refills: 0 | Status: SHIPPED | OUTPATIENT
Start: 2019-10-14

## 2019-10-14 RX ORDER — CYCLOBENZAPRINE HCL 10 MG
10 TABLET ORAL 3 TIMES DAILY PRN
Qty: 30 TABLET | Refills: 0 | Status: SHIPPED | OUTPATIENT
Start: 2019-10-14 | End: 2019-10-24

## 2019-10-23 ENCOUNTER — APPOINTMENT (OUTPATIENT)
Dept: URGENT CARE | Facility: CLINIC | Age: 54
End: 2019-10-23
Payer: OTHER MISCELLANEOUS

## 2019-10-23 PROCEDURE — 99213 OFFICE O/P EST LOW 20 MIN: CPT

## 2020-02-03 ENCOUNTER — TELEPHONE (OUTPATIENT)
Dept: FAMILY MEDICINE CLINIC | Facility: CLINIC | Age: 55
End: 2020-02-03